# Patient Record
Sex: FEMALE | Race: WHITE | NOT HISPANIC OR LATINO
[De-identification: names, ages, dates, MRNs, and addresses within clinical notes are randomized per-mention and may not be internally consistent; named-entity substitution may affect disease eponyms.]

---

## 2017-01-24 ENCOUNTER — RESULT REVIEW (OUTPATIENT)
Age: 52
End: 2017-01-24

## 2017-01-25 ENCOUNTER — APPOINTMENT (OUTPATIENT)
Dept: OBGYN | Facility: CLINIC | Age: 52
End: 2017-01-25

## 2018-02-20 ENCOUNTER — RESULT REVIEW (OUTPATIENT)
Age: 53
End: 2018-02-20

## 2018-02-21 ENCOUNTER — APPOINTMENT (OUTPATIENT)
Dept: ENDOCRINOLOGY | Facility: CLINIC | Age: 53
End: 2018-02-21
Payer: COMMERCIAL

## 2018-02-21 VITALS
HEART RATE: 93 BPM | HEIGHT: 62 IN | OXYGEN SATURATION: 98 % | BODY MASS INDEX: 16.75 KG/M2 | WEIGHT: 91 LBS | SYSTOLIC BLOOD PRESSURE: 100 MMHG | DIASTOLIC BLOOD PRESSURE: 60 MMHG

## 2018-02-21 PROCEDURE — 99213 OFFICE O/P EST LOW 20 MIN: CPT

## 2018-02-21 RX ORDER — HYDROCORTISONE 25 MG/G
2.5 CREAM TOPICAL
Qty: 28 | Refills: 0 | Status: COMPLETED | COMMUNITY
Start: 2017-10-18

## 2018-02-21 RX ORDER — SULFAMETHOXAZOLE AND TRIMETHOPRIM 800; 160 MG/1; MG/1
800-160 TABLET ORAL
Qty: 14 | Refills: 0 | Status: COMPLETED | COMMUNITY
Start: 2018-02-18

## 2018-10-29 ENCOUNTER — TRANSCRIPTION ENCOUNTER (OUTPATIENT)
Age: 53
End: 2018-10-29

## 2018-11-06 ENCOUNTER — APPOINTMENT (OUTPATIENT)
Dept: ENDOCRINOLOGY | Facility: CLINIC | Age: 53
End: 2018-11-06
Payer: COMMERCIAL

## 2018-11-06 VITALS — SYSTOLIC BLOOD PRESSURE: 104 MMHG | DIASTOLIC BLOOD PRESSURE: 70 MMHG | HEART RATE: 88 BPM | OXYGEN SATURATION: 97 %

## 2018-11-06 VITALS — WEIGHT: 94 LBS | HEIGHT: 62 IN | BODY MASS INDEX: 17.3 KG/M2

## 2018-11-06 PROCEDURE — 77080 DXA BONE DENSITY AXIAL: CPT

## 2018-11-06 PROCEDURE — 99214 OFFICE O/P EST MOD 30 MIN: CPT | Mod: 25

## 2018-11-06 PROCEDURE — ZZZZZ: CPT

## 2018-11-06 RX ORDER — METRONIDAZOLE 7.5 MG/G
0.75 GEL VAGINAL
Qty: 70 | Refills: 0 | Status: COMPLETED | COMMUNITY
Start: 2018-10-31

## 2018-11-06 RX ORDER — FLUCONAZOLE 150 MG/1
150 TABLET ORAL
Qty: 2 | Refills: 0 | Status: COMPLETED | COMMUNITY
Start: 2018-10-31

## 2018-11-06 RX ORDER — AMOXICILLIN AND CLAVULANATE POTASSIUM 875; 125 MG/1; MG/1
875-125 TABLET, COATED ORAL
Qty: 14 | Refills: 0 | Status: COMPLETED | COMMUNITY
Start: 2018-10-15

## 2018-11-06 RX ORDER — PHENAZOPYRIDINE HYDROCHLORIDE 100 MG/1
100 TABLET ORAL
Qty: 12 | Refills: 0 | Status: COMPLETED | COMMUNITY
Start: 2018-10-29

## 2018-11-06 RX ORDER — NITROFURANTOIN (MONOHYDRATE/MACROCRYSTALS) 25; 75 MG/1; MG/1
100 CAPSULE ORAL
Qty: 10 | Refills: 0 | Status: COMPLETED | COMMUNITY
Start: 2018-10-29

## 2018-11-06 RX ORDER — PANTOPRAZOLE 40 MG/1
40 TABLET, DELAYED RELEASE ORAL
Qty: 30 | Refills: 0 | Status: COMPLETED | COMMUNITY
Start: 2018-07-15

## 2018-11-08 ENCOUNTER — APPOINTMENT (OUTPATIENT)
Dept: OBGYN | Facility: CLINIC | Age: 53
End: 2018-11-08
Payer: COMMERCIAL

## 2018-11-08 PROCEDURE — 99214 OFFICE O/P EST MOD 30 MIN: CPT

## 2018-11-08 PROCEDURE — 83986 ASSAY PH BODY FLUID NOS: CPT | Mod: QW

## 2019-07-22 ENCOUNTER — APPOINTMENT (OUTPATIENT)
Dept: OBGYN | Facility: CLINIC | Age: 54
End: 2019-07-22

## 2020-01-27 ENCOUNTER — APPOINTMENT (OUTPATIENT)
Dept: ENDOCRINOLOGY | Facility: CLINIC | Age: 55
End: 2020-01-27
Payer: COMMERCIAL

## 2020-01-27 VITALS
HEART RATE: 74 BPM | HEIGHT: 62 IN | WEIGHT: 100 LBS | BODY MASS INDEX: 18.4 KG/M2 | SYSTOLIC BLOOD PRESSURE: 100 MMHG | DIASTOLIC BLOOD PRESSURE: 70 MMHG | OXYGEN SATURATION: 98 %

## 2020-01-27 PROCEDURE — ZZZZZ: CPT

## 2020-01-27 PROCEDURE — 99214 OFFICE O/P EST MOD 30 MIN: CPT | Mod: 25

## 2020-01-27 PROCEDURE — 77080 DXA BONE DENSITY AXIAL: CPT

## 2020-01-27 RX ORDER — BACILLUS COAGULANS/INULIN 1B-250 MG
CAPSULE ORAL
Refills: 0 | Status: DISCONTINUED | COMMUNITY
End: 2020-01-27

## 2020-01-27 NOTE — REVIEW OF SYSTEMS
[Heartburn] : heartburn [Joint Pain] : joint pain [Hot Flashes] : hot flashes [Negative] : Heme/Lymph

## 2020-01-28 NOTE — PROCEDURE
[FreeTextEntry1] : Thyroid US - 01/27/2020\par Right: 7 x 5 x 9 mm nodule, some heterogeneity bilaterally\par \par Bone mineral density: 01/27/2020 \par Indication: vs. 2018 prior test showed progressive bone loss \par Spine: -2.0 osteopenia, -5.4%\par Total hip: -1.6 osteopenia, no significant change, decreased vs. 2 years ago\par Femoral neck: -2.0 osteopenia, no significant change\par Proximal radius: +0.1 normal -4.4%\par \par Bone mineral density: 11/06/2018 \par Indication: vs. 2016, 2 year comparison\par Spine: -1.5 osteopenia (-3.7%)\par Total hip: -1.5 osteopenia (-4.4%)\par Femoral neck: -2.0 osteopenia (-5.0%)\par Proximal radius: 0.7 normal, no significant change \par \par Bone mineral density November 29, 2016\par Two-year Comparison\par Spine -1.2, osteopenia, no significant change\par Total hip - 1.2, osteopenia, no significant change\par Femoral neck -1.7, osteopenia, no significant change\par Proximal radius 0.8, normal, no significant change\par \par bone mineral density test performed September 16, 2014\par Spine -1.1, osteopenia, stable\par Total hp -1.0, normal\par Femoral neck -1.6, osteopenia, stable\par Proximal radius 0.9, normal\par

## 2020-01-28 NOTE — PHYSICAL EXAM
[Alert] : alert [No Acute Distress] : no acute distress [Well Nourished] : well nourished [Well Developed] : well developed [Normal Sclera/Conjunctiva] : normal sclera/conjunctiva [No Proptosis] : no proptosis [Normal Oropharynx] : the oropharynx was normal [Thyroid Not Enlarged] : the thyroid was not enlarged [No Thyroid Nodules] : there were no palpable thyroid nodules [No Respiratory Distress] : no respiratory distress [No Accessory Muscle Use] : no accessory muscle use [Clear to Auscultation] : lungs were clear to auscultation bilaterally [Normal Rate] : heart rate was normal  [Normal S1, S2] : normal S1 and S2 [Regular Rhythm] : with a regular rhythm [Normal Bowel Sounds] : normal bowel sounds [Not Tender] : non-tender [Soft] : abdomen soft [Not Distended] : not distended [Anterior Cervical Nodes] : anterior cervical nodes [Normal] : normal and non tender [No Spinal Tenderness] : no spinal tenderness [Spine Straight] : spine straight [No Stigmata of Cushings Syndrome] : no stigmata of cushings syndrome [Normal Gait] : normal gait [Normal Strength/Tone] : muscle strength and tone were normal [No Rash] : no rash [Normal Reflexes] : deep tendon reflexes were 2+ and symmetric [No Tremors] : no tremors [Oriented x3] : oriented to person, place, and time [de-identified] : sl blocked R submandibular gland

## 2020-01-28 NOTE — END OF VISIT
[FreeTextEntry3] : I, Elie Martinez, authored this note working as a medical scribe for Dr. Hernandez.  01/27/2020.  3:30PM. This note was authored by the medical scribe for me. I have reviewed, edited, and revised the note as needed. I am in agreement with the exam findings, imaging findings, and treatment plan.  Boaz Hernandez MD

## 2020-01-28 NOTE — HISTORY OF PRESENT ILLNESS
[Calcium (dietary)] : calcium from their regular diet [Regular Dental Follow-Up] : regular dental follow-up [FreeTextEntry1] : f/u for 54 year-old female w/ osteopenia\par \par Pt has a long h/o GERD treated with proton pump inhibitors. Because of this a BMD was performed which showed T score of -1.1 in the spine and -1.6 in the femoral neck. No h/o fx. She has no other unusual risk factors for osteoporosis. No fhx of osteoporosis. Menopausal at age 51 with no plan for HRT. Occ hot flashes. She has a diet rich in dairy foods which has increased since being told of low bone density. Repeat BMD in 2014 and 2016 are stable with osteopenia in the hip and spine and normal density in the proximal radius. BMD 11/2018 indicates sl decrease in the hip and spine though this is still consistent with osteopenia. Currently not on any osteoporosis rx.\par \par Pt previously saw an orthopedic surgeon, she has tendonitis in her hand and bursitis in her elbow. [Amenorrhea] : no past or present history of amenorrhea [Disordered Eating] : no past or present history of disordered eating [Taking Steroids] : no past or present history of taking steroids [Kidney Stones] : no history of kidney stones [High Fall Risk] : no fall risk [Family History of Osteoporosis] : no family history of osteoporosis [Family History of Breast Cancer] : no family history of breast cancer [Family History of Hip Fracture] : no family history of hip fracture [Hyperparathyroidism] : no hyperparathyroidism [History of Radiation Therapy] : no history of radiation therapy [History of Blood Clots] : no history of blood clots

## 2020-01-28 NOTE — ASSESSMENT
[Bisphosphonate Therapy] : Risks  and benefits of bisphosphonate therapy were  discussed with the patient including gastroesophageal irritation, osteonecrosis of the jaw, and atypical femur fractures, and acute phase reaction [Bisphosphonates] : The patient was instructed to take bisphosphonates on an empty stomach with a full glass of water,and wait at least 30 minutes before eating or lying down [FreeTextEntry1] : f/u 54 year-old female w/ osteopenia\par \par Pt is menopausal, not on HRT. Pt has hot flashes, this does not disturb her sleep and is only occasional. \par BMD in 2014 and in 2016 indicate stable osteopenia in te spine and hip and normal density in the proximal radius. BMD 11/2018 indicates sl decrease in the hip and spine though this is still consistent with osteopenia. This is consistent with bone loss seen with menopause. I do not see a reason to treat her bone density with medical rx at this time. BMD 1/2020 indicates worsening osteopenia in spine, stable osteopenia in hip although total hip is drifting down, and normal proximal radius although this is trending downwards also. I discussed that weight bearing exercise, cardiovascular activities, and diet are beneficial to overall health, but are not adequate for bone density increase or alternative to osteoporosis medication.\par \par I recommend the pt start rx due to increased risk for future fx. I discussed rx with bisphosphonate therapy, including Fosamax, Actonel, Boniva, and IV Reclast. Risks and benefits of bisphosphonate therapy were discussed with the patient including minor aches & pains, heartburn, gastroesophageal irritation (excluding IV Reclast), osteonecrosis of the jaw, and atypical femur fractures, and acute phase reaction. Pt would benefit from bisphosphonate therapy with the expectation of a drug holiday within 5 years. All questions were answered. Pt understands and will notify me of rx decision. Medication instructions reviewed.\par \par f/u in 6 months

## 2020-07-27 ENCOUNTER — APPOINTMENT (OUTPATIENT)
Dept: ENDOCRINOLOGY | Facility: CLINIC | Age: 55
End: 2020-07-27

## 2021-02-24 ENCOUNTER — NON-APPOINTMENT (OUTPATIENT)
Age: 56
End: 2021-02-24

## 2021-03-18 ENCOUNTER — LABORATORY RESULT (OUTPATIENT)
Age: 56
End: 2021-03-18

## 2021-03-18 ENCOUNTER — APPOINTMENT (OUTPATIENT)
Dept: ENDOCRINOLOGY | Facility: CLINIC | Age: 56
End: 2021-03-18
Payer: COMMERCIAL

## 2021-03-18 VITALS — DIASTOLIC BLOOD PRESSURE: 60 MMHG | OXYGEN SATURATION: 96 % | SYSTOLIC BLOOD PRESSURE: 100 MMHG | HEART RATE: 87 BPM

## 2021-03-18 VITALS — TEMPERATURE: 98.2 F | WEIGHT: 95 LBS | HEIGHT: 61.8 IN | BODY MASS INDEX: 17.48 KG/M2

## 2021-03-18 PROCEDURE — 99214 OFFICE O/P EST MOD 30 MIN: CPT | Mod: 25

## 2021-03-18 PROCEDURE — 77080 DXA BONE DENSITY AXIAL: CPT

## 2021-03-18 RX ORDER — FAMOTIDINE 10 MG/1
TABLET, FILM COATED ORAL
Refills: 0 | Status: ACTIVE | COMMUNITY

## 2021-03-18 RX ORDER — COLD-HOT PACK
EACH MISCELLANEOUS
Refills: 0 | Status: ACTIVE | COMMUNITY

## 2021-03-18 RX ORDER — OMEGA-3/DHA/EPA/FISH OIL 300-1000MG
CAPSULE ORAL
Refills: 0 | Status: ACTIVE | COMMUNITY

## 2021-03-18 NOTE — PHYSICAL EXAM
[Alert] : alert [Well Nourished] : well nourished [No Acute Distress] : no acute distress [Well Developed] : well developed [Normal Sclera/Conjunctiva] : normal sclera/conjunctiva [EOMI] : extra ocular movement intact [No Proptosis] : no proptosis [Thyroid Not Enlarged] : the thyroid was not enlarged [Clear to Auscultation] : lungs were clear to auscultation bilaterally [Normal S1, S2] : normal S1 and S2 [Normal Rate] : heart rate was normal [Regular Rhythm] : with a regular rhythm [No Edema] : no peripheral edema [Normal Bowel Sounds] : normal bowel sounds [Not Tender] : non-tender [Not Distended] : not distended [Soft] : abdomen soft [Normal Anterior Cervical Nodes] : no anterior cervical lymphadenopathy [No Spinal Tenderness] : no spinal tenderness [Spine Straight] : spine straight [No Stigmata of Cushings Syndrome] : no stigmata of Cushings Syndrome [Normal Gait] : normal gait [Normal Reflexes] : deep tendon reflexes were 2+ and symmetric [No Tremors] : no tremors [Oriented x3] : oriented to person, place, and time

## 2021-03-18 NOTE — ASSESSMENT
[Bisphosphonate Therapy] : Risks and benefits of bisphosphonate therapy were  discussed with the patient including gastroesophageal irritation, osteonecrosis of the jaw, and atypical femur fractures, and acute phase reaction [Bisphosphonates] : The patient was instructed to take bisphosphonates on an empty stomach with a full glass of water,and wait at least 30 minutes before eating or lying down [FreeTextEntry1] : 55 year-old female w/ osteopenia\par \par Pt is menopausal, not on HRT. Pt has hot flashes, this does not disturb her sleep and is only occasional. \par BMD in 2014 and in 2016 indicate stable osteopenia in te spine and hip and normal density in the proximal radius. BMD 11/2018 indicates sl decrease in the hip and spine though this is still consistent with osteopenia. This is consistent with bone loss seen with menopause. I do not see a reason to treat her bone density with medical rx at this time. BMD 1/2020 indicates worsening osteopenia in spine, stable osteopenia in hip although total hip is drifting down, and normal proximal radius although this is trending downwards also. Pt started ibandronate 2/2020 but d/c after one dose due to severe aches and pain. Took correctly. No interval fx, no UGI sx, no thigh pain. No heartburn. No ONJ. Pt did not f/u do to COVID. BMD 3/2021 indicates worsening osteopenia in spine, stable osteopenia in hip, and normal worsening proximal radius. BMD results reviewed w/ pt.\par \par Options of therapy discussed. Reviewed rechallenging with ibandronate and see if symptoms return. Pt agrees to restart Ibandronate. If symptoms develop she will contact me and will revaluate options.\par \par I discussed that weight bearing exercise, cardiovascular activities, and diet are beneficial to overall health, but are not adequate for bone density increase or alternative to osteoporosis medication.\par \par H/o thyroid nodule. TUS 3/2021 c/w trivial subcentimeter nodule. Observe.\par \par Request labs sent out.\par \par F/u in 1 year

## 2021-03-18 NOTE — HISTORY OF PRESENT ILLNESS
[Calcium (dietary)] : calcium from their regular diet [Regular Dental Follow-Up] : regular dental follow-up [FreeTextEntry1] : No significant health change. No interval surgeries, fractures, health change, or change in medications.\par \par Pt has a long h/o GERD treated with proton pump inhibitors. Because of this a BMD was performed which showed T score of -1.1 in the spine and -1.6 in the femoral neck. No h/o fx. She has no other unusual risk factors for osteoporosis. No fhx of osteoporosis. Menopausal at age 51 with no plan for HRT. Occ hot flashes. She has a diet rich in dairy foods which has increased since being told of low bone density. Repeat BMD in 2014 and 2016 are stable with osteopenia in the hip and spine and normal density in the proximal radius. BMD 11/2018 indicated sl decrease in the hip and spine though this is still consistent with osteopenia. BMD 1/2020 indicates worsening osteopenia in spine, stable osteopenia in hip although total hip is drifting down, and normal proximal radius although this is trending downwards also. Pt started ibandronate 2/2020 but d/c after one dose due to severe aches and pain. Took correctly. No interval fx, no UGI sx, no thigh pain. No heartburn. Last DDS within past 6 months. No ONJ. \par \par Pt previously saw an orthopedic surgeon, she has tendonitis in her hand and bursitis in her elbow.\par \par Pt c/o frequent fatigue. [Amenorrhea] : no past or present history of amenorrhea [Disordered Eating] : no past or present history of disordered eating [Taking Steroids] : no past or present history of taking steroids [Kidney Stones] : no history of kidney stones [High Fall Risk] : no fall risk [Family History of Osteoporosis] : no family history of osteoporosis [Family History of Breast Cancer] : no family history of breast cancer [Family History of Hip Fracture] : no family history of hip fracture [Hyperparathyroidism] : no hyperparathyroidism [History of Radiation Therapy] : no history of radiation therapy [History of Blood Clots] : no history of blood clots

## 2021-03-18 NOTE — PROCEDURE
[FreeTextEntry1] : Thyroid US - 03/18/2021\par Right: nodule 6 mm x 5 mm x 9 mm\par \par Bone mineral density: 03/18/2021 \par Indication: vs. 2020 prior test showed bone loss\par Spine: -2.2 osteopenia, -3.8%\par Total hip: -1.7 osteopenia, no significant change\par Femoral neck: -2.0 osteopenia, no significant change\par Proximal radius: -0.2 normal, -3.1%\par \par Thyroid US - 01/27/2020\par Right: 7 x 5 x 9 mm nodule, some heterogeneity bilaterally\par \par Bone mineral density: 01/27/2020 \par Indication: vs. 2018 prior test showed progressive bone loss \par Spine: -2.0 osteopenia, -5.4%\par Total hip: -1.6 osteopenia, no significant change, decreased vs. 2 years ago\par Femoral neck: -2.0 osteopenia, no significant change\par Proximal radius: +0.1 normal -4.4%\par \par Bone mineral density: 11/06/2018 \par Indication: vs. 2016, 2 year comparison\par Spine: -1.5 osteopenia (-3.7%)\par Total hip: -1.5 osteopenia (-4.4%)\par Femoral neck: -2.0 osteopenia (-5.0%)\par Proximal radius: 0.7 normal, no significant change \par \par Bone mineral density November 29, 2016\par Two-year Comparison\par Spine -1.2, osteopenia, no significant change\par Total hip - 1.2, osteopenia, no significant change\par Femoral neck -1.7, osteopenia, no significant change\par Proximal radius 0.8, normal, no significant change\par \par bone mineral density test performed September 16, 2014\par Spine -1.1, osteopenia, stable\par Total hp -1.0, normal\par Femoral neck -1.6, osteopenia, stable\par Proximal radius 0.9, normal\par

## 2021-03-18 NOTE — END OF VISIT
[FreeTextEntry3] : I, Elie Martinez, authored this note working as a medical scribe for Dr. Hernandez.  03/18/2021.  4:00PM. This note was authored by the medical scribe for me. I have reviewed, edited, and revised the note as needed. I am in agreement with the exam findings, imaging findings, and treatment plan.  Boaz Hernandez MD

## 2021-03-19 LAB
25(OH)D3 SERPL-MCNC: 50.7 NG/ML
ALBUMIN SERPL ELPH-MCNC: 5.1 G/DL
ALP BLD-CCNC: 51 U/L
ALT SERPL-CCNC: 12 U/L
ANION GAP SERPL CALC-SCNC: 12 MMOL/L
AST SERPL-CCNC: 22 U/L
BASOPHILS # BLD AUTO: 0.06 K/UL
BASOPHILS NFR BLD AUTO: 1.2 %
BILIRUB SERPL-MCNC: 0.3 MG/DL
BUN SERPL-MCNC: 15 MG/DL
CALCIUM SERPL-MCNC: 10 MG/DL
CHLORIDE SERPL-SCNC: 102 MMOL/L
CO2 SERPL-SCNC: 26 MMOL/L
CREAT SERPL-MCNC: 0.95 MG/DL
EOSINOPHIL # BLD AUTO: 0.04 K/UL
EOSINOPHIL NFR BLD AUTO: 0.8 %
GLUCOSE SERPL-MCNC: 103 MG/DL
HCT VFR BLD CALC: 38.7 %
HGB BLD-MCNC: 12.6 G/DL
IMM GRANULOCYTES NFR BLD AUTO: 0.2 %
LYMPHOCYTES # BLD AUTO: 1.63 K/UL
LYMPHOCYTES NFR BLD AUTO: 31.8 %
MAN DIFF?: NORMAL
MCHC RBC-ENTMCNC: 29.9 PG
MCHC RBC-ENTMCNC: 32.6 GM/DL
MCV RBC AUTO: 91.9 FL
MONOCYTES # BLD AUTO: 0.34 K/UL
MONOCYTES NFR BLD AUTO: 6.6 %
NEUTROPHILS # BLD AUTO: 3.04 K/UL
NEUTROPHILS NFR BLD AUTO: 59.4 %
PLATELET # BLD AUTO: 304 K/UL
POTASSIUM SERPL-SCNC: 4.2 MMOL/L
PROT SERPL-MCNC: 7.1 G/DL
RBC # BLD: 4.21 M/UL
RBC # FLD: 12.1 %
SODIUM SERPL-SCNC: 140 MMOL/L
T3RU NFR SERPL: 1.1 TBI
T4 SERPL-MCNC: 6.2 UG/DL
TSH SERPL-ACNC: 1.3 UIU/ML
VIT B12 SERPL-MCNC: 1071 PG/ML
WBC # FLD AUTO: 5.12 K/UL

## 2022-03-15 ENCOUNTER — APPOINTMENT (OUTPATIENT)
Dept: ENDOCRINOLOGY | Facility: CLINIC | Age: 57
End: 2022-03-15

## 2022-03-18 ENCOUNTER — APPOINTMENT (OUTPATIENT)
Dept: ENDOCRINOLOGY | Facility: CLINIC | Age: 57
End: 2022-03-18
Payer: COMMERCIAL

## 2022-03-18 VITALS — DIASTOLIC BLOOD PRESSURE: 70 MMHG | OXYGEN SATURATION: 98 % | HEART RATE: 88 BPM | SYSTOLIC BLOOD PRESSURE: 100 MMHG

## 2022-03-18 VITALS — HEIGHT: 61.9 IN | BODY MASS INDEX: 17.66 KG/M2 | TEMPERATURE: 98 F | WEIGHT: 96 LBS

## 2022-03-18 PROCEDURE — 77080 DXA BONE DENSITY AXIAL: CPT

## 2022-03-18 PROCEDURE — ZZZZZ: CPT

## 2022-03-18 PROCEDURE — 99214 OFFICE O/P EST MOD 30 MIN: CPT | Mod: 25

## 2022-03-18 RX ORDER — BIOTIN 10 MG
TABLET ORAL
Refills: 0 | Status: DISCONTINUED | COMMUNITY
End: 2022-03-18

## 2022-03-18 RX ORDER — IBANDRONATE SODIUM 150 MG/1
150 TABLET ORAL
Qty: 3 | Refills: 3 | Status: DISCONTINUED | COMMUNITY
Start: 2020-02-04 | End: 2022-03-18

## 2022-03-18 NOTE — ASSESSMENT
[Bisphosphonate Therapy] : Risks and benefits of bisphosphonate therapy were  discussed with the patient including gastroesophageal irritation, osteonecrosis of the jaw, and atypical femur fractures, and acute phase reaction [Bisphosphonates] : The patient was instructed to take bisphosphonates on an empty stomach with a full glass of water,and wait at least 30 minutes before eating or lying down [FreeTextEntry1] : 56 year-old female w/ osteopenia\par \par Began medical therapy, ibandronate 2020 for decreasing bone mass.  Patient tolerating therapy after initial concerns.  Patient does have active upper GI issues but this does not appear to be related to day of ibandronate dosing.  Repeat bone density is stable placing patient at fairly average risk for future fracture.  Options of therapy discussed in detail but recommend beginning drug holiday repeat bone density 1 year.\par \par I discussed that weight bearing exercise, cardiovascular activities, and diet are beneficial to overall health, but are not adequate for bone density increase or alternative to osteoporosis medication.\par \par H/o thyroid nodule. TUS 3/2022 c/w trivial subcentimeter nodule. Observe.\par \par Request labs from Dr. Masoud Carlisle\par \par F/u in 1 year\par repeat BMD next visit

## 2022-06-15 ENCOUNTER — APPOINTMENT (OUTPATIENT)
Dept: ENDOCRINOLOGY | Facility: CLINIC | Age: 57
End: 2022-06-15

## 2023-01-19 ENCOUNTER — APPOINTMENT (OUTPATIENT)
Dept: GASTROENTEROLOGY | Facility: CLINIC | Age: 58
End: 2023-01-19

## 2023-04-20 ENCOUNTER — APPOINTMENT (OUTPATIENT)
Dept: ENDOCRINOLOGY | Facility: CLINIC | Age: 58
End: 2023-04-20
Payer: COMMERCIAL

## 2023-04-20 ENCOUNTER — TRANSCRIPTION ENCOUNTER (OUTPATIENT)
Age: 58
End: 2023-04-20

## 2023-04-20 VITALS
WEIGHT: 101 LBS | DIASTOLIC BLOOD PRESSURE: 62 MMHG | HEIGHT: 61.3 IN | HEART RATE: 73 BPM | OXYGEN SATURATION: 98 % | BODY MASS INDEX: 18.82 KG/M2 | SYSTOLIC BLOOD PRESSURE: 94 MMHG

## 2023-04-20 DIAGNOSIS — E04.1 NONTOXIC SINGLE THYROID NODULE: ICD-10-CM

## 2023-04-20 DIAGNOSIS — M85.80 OTHER SPECIFIED DISORDERS OF BONE DENSITY AND STRUCTURE, UNSPECIFIED SITE: ICD-10-CM

## 2023-04-20 PROCEDURE — ZZZZZ: CPT

## 2023-04-20 PROCEDURE — 99214 OFFICE O/P EST MOD 30 MIN: CPT | Mod: 25

## 2023-04-20 PROCEDURE — 77080 DXA BONE DENSITY AXIAL: CPT

## 2023-04-21 PROBLEM — E04.1 THYROID NODULE: Status: ACTIVE | Noted: 2021-03-18

## 2023-04-21 NOTE — ASSESSMENT
[Bisphosphonate Therapy] : Risks and benefits of bisphosphonate therapy were  discussed with the patient including gastroesophageal irritation, osteonecrosis of the jaw, and atypical femur fractures, and acute phase reaction [Bisphosphonates] : The patient was instructed to take bisphosphonates on an empty stomach with a full glass of water,and wait at least 30 minutes before eating or lying down [FreeTextEntry1] : 57 year-old female w/ osteopenia\par \par Began medical therapy, ibandronate 2020 for decreasing bone mass.  Patient tolerating therapy after initial concerns.  Patient does have active upper GI issues but this does not appear to be related to day of ibandronate dosing.  Repeat bone density is stable placing patient at fairly average risk for future fracture.\par   Began drug holiday 2022.  Repeat bone density 2023 stable osteopenia.  Recommend continuing drug holiday.\par \par I discussed that weight bearing exercise, cardiovascular activities, and diet are beneficial to overall health, but are not adequate for bone density increase or alternative to osteoporosis medication.\par \par H/o thyroid nodule. TUS 2023  trivial subcentimeter nodule. Observe.\par \par Request labs from Dr. Masoud Carlisle\par \par F/u in 1 year\par repeat BMD next visit

## 2023-04-21 NOTE — PROCEDURE
[FreeTextEntry1] : thyroid ultrasound  4/20/23\par Single right nodule 6 mm x 6 mm x 8 mm\par \par \par Bone mineral density: 04/20/2023\par indication: Compared to 2022 assess response to medication\par spine -2.2 osteopenia no significant change\par total hip -1.8 osteopenia no significant change\par femoral neck -2.2 osteopenia no significant change\par proximal radius -0.6 normal no significant change\par \par Thyroid ultrasound March 18, 2022\par Single right nodule 6 mm x 5 mm x 8 mm\par \par Bone mineral density: 03/18/2022\par indication: Compared to 2021 assess response to medication\par spine -2.1 osteopenia no significant change\par total hip -1.6 osteopenia no significant change\par femoral neck -2.0 osteopenia no significant change\par proximal radius -0.4 normal no significant change\par \par Thyroid US - 03/18/2021\par Right: nodule 6 mm x 5 mm x 9 mm\par \par Bone mineral density: 03/18/2021 \par Indication: vs. 2020 prior test showed bone loss\par Spine: -2.2 osteopenia, -3.8%\par Total hip: -1.7 osteopenia, no significant change\par Femoral neck: -2.0 osteopenia, no significant change\par Proximal radius: -0.2 normal, -3.1%\par \par Thyroid US - 01/27/2020\par Right: 7 x 5 x 9 mm nodule, some heterogeneity bilaterally\par \par Bone mineral density: 01/27/2020 \par Indication: vs. 2018 prior test showed progressive bone loss \par Spine: -2.0 osteopenia, -5.4%\par Total hip: -1.6 osteopenia, no significant change, decreased vs. 2 years ago\par Femoral neck: -2.0 osteopenia, no significant change\par Proximal radius: +0.1 normal -4.4%\par \par Bone mineral density: 11/06/2018 \par Indication: vs. 2016, 2 year comparison\par Spine: -1.5 osteopenia (-3.7%)\par Total hip: -1.5 osteopenia (-4.4%)\par Femoral neck: -2.0 osteopenia (-5.0%)\par Proximal radius: 0.7 normal, no significant change \par \par Bone mineral density November 29, 2016\par Two-year Comparison\par Spine -1.2, osteopenia, no significant change\par Total hip - 1.2, osteopenia, no significant change\par Femoral neck -1.7, osteopenia, no significant change\par Proximal radius 0.8, normal, no significant change\par \par bone mineral density test performed September 16, 2014\par Spine -1.1, osteopenia, stable\par Total hp -1.0, normal\par Femoral neck -1.6, osteopenia, stable\par Proximal radius 0.9, normal\par

## 2023-04-21 NOTE — HISTORY OF PRESENT ILLNESS
[Calcium (dietary)] : calcium from their regular diet [Regular Dental Follow-Up] : regular dental follow-up [FreeTextEntry1] : No significant health change. No interval surgeries, fractures, health change, or change in medications.\par \par Pt has a long h/o GERD treated with proton pump inhibitors. Because of this a BMD was performed which showed T score of -1.1 in the spine and -1.6 in the femoral neck. No h/o fx. She has no other unusual risk factors for osteoporosis. No fhx of osteoporosis. Menopausal at age 51 with no plan for HRT. Occ hot flashes. She has a diet rich in dairy foods which has increased since being told of low bone density. Repeat BMD in 2014 and 2016 are stable with osteopenia in the hip and spine and normal density in the proximal radius. BMD 11/2018 indicated sl decrease in the hip and spine though this is still consistent with osteopenia. BMD 1/2020 indicates worsening osteopenia in spine, stable osteopenia in hip although total hip is drifting down, and normal proximal radius although this is trending downwards also. Pt started ibandronate 2/2020 but d/c after one dose due to severe aches and pain. \par Restarted medicine which she is taking correctly and tolerating well.  She does have some upper GI symptoms him not related to timing of dose.\par  . Last DDS within past 6 months. No ONJ. \par History of subcentimeter thyroid nodule.  Low no local neck changes.  No obvious symptoms of hyperthyroidism or hypothyroidism other than fatigue which is stable.\par  [Amenorrhea] : no past or present history of amenorrhea [Disordered Eating] : no past or present history of disordered eating [Taking Steroids] : no past or present history of taking steroids [Kidney Stones] : no history of kidney stones [High Fall Risk] : no fall risk [Family History of Osteoporosis] : no family history of osteoporosis [Family History of Breast Cancer] : no family history of breast cancer [Family History of Hip Fracture] : no family history of hip fracture [Hyperparathyroidism] : no hyperparathyroidism [History of Radiation Therapy] : no history of radiation therapy [History of Blood Clots] : no history of blood clots

## 2023-08-24 ENCOUNTER — NON-APPOINTMENT (OUTPATIENT)
Age: 58
End: 2023-08-24

## 2023-09-14 ENCOUNTER — APPOINTMENT (OUTPATIENT)
Dept: GASTROENTEROLOGY | Facility: CLINIC | Age: 58
End: 2023-09-14
Payer: COMMERCIAL

## 2023-09-14 VITALS — SYSTOLIC BLOOD PRESSURE: 102 MMHG | DIASTOLIC BLOOD PRESSURE: 65 MMHG | OXYGEN SATURATION: 98 % | HEART RATE: 76 BPM

## 2023-09-14 VITALS — WEIGHT: 98 LBS | BODY MASS INDEX: 18.34 KG/M2

## 2023-09-14 DIAGNOSIS — R12 HEARTBURN: ICD-10-CM

## 2023-09-14 PROCEDURE — 99204 OFFICE O/P NEW MOD 45 MIN: CPT

## 2023-09-15 ENCOUNTER — RX RENEWAL (OUTPATIENT)
Age: 58
End: 2023-09-15

## 2023-10-05 ENCOUNTER — LABORATORY RESULT (OUTPATIENT)
Age: 58
End: 2023-10-05

## 2023-10-05 ENCOUNTER — APPOINTMENT (OUTPATIENT)
Dept: OBGYN | Facility: CLINIC | Age: 58
End: 2023-10-05
Payer: COMMERCIAL

## 2023-10-05 VITALS — DIASTOLIC BLOOD PRESSURE: 75 MMHG | HEIGHT: 61 IN | SYSTOLIC BLOOD PRESSURE: 108 MMHG

## 2023-10-05 DIAGNOSIS — Z01.419 ENCOUNTER FOR GYNECOLOGICAL EXAMINATION (GENERAL) (ROUTINE) W/OUT ABNORMAL FINDINGS: ICD-10-CM

## 2023-10-05 PROCEDURE — 82270 OCCULT BLOOD FECES: CPT

## 2023-10-05 PROCEDURE — 99213 OFFICE O/P EST LOW 20 MIN: CPT | Mod: 25

## 2023-10-05 PROCEDURE — 99386 PREV VISIT NEW AGE 40-64: CPT

## 2023-10-08 LAB — HPV HIGH+LOW RISK DNA PNL CVX: DETECTED

## 2023-10-11 LAB — CYTOLOGY CVX/VAG DOC THIN PREP: ABNORMAL

## 2023-10-22 ENCOUNTER — RX RENEWAL (OUTPATIENT)
Age: 58
End: 2023-10-22

## 2023-10-22 RX ORDER — FAMOTIDINE 20 MG/1
20 TABLET, FILM COATED ORAL
Qty: 270 | Refills: 3 | Status: ACTIVE | COMMUNITY
Start: 2023-09-14 | End: 1900-01-01

## 2023-12-05 ENCOUNTER — APPOINTMENT (OUTPATIENT)
Dept: OBGYN | Facility: CLINIC | Age: 58
End: 2023-12-05

## 2024-01-04 ENCOUNTER — RX RENEWAL (OUTPATIENT)
Age: 59
End: 2024-01-04

## 2024-01-04 RX ORDER — CONJUGATED ESTROGENS 0.62 MG/G
0.62 CREAM VAGINAL
Qty: 30 | Refills: 0 | Status: ACTIVE | COMMUNITY
Start: 2023-10-06 | End: 1900-01-01

## 2024-01-08 ENCOUNTER — APPOINTMENT (OUTPATIENT)
Dept: OBGYN | Facility: CLINIC | Age: 59
End: 2024-01-08
Payer: COMMERCIAL

## 2024-01-08 ENCOUNTER — ASOB RESULT (OUTPATIENT)
Age: 59
End: 2024-01-08

## 2024-01-08 VITALS — DIASTOLIC BLOOD PRESSURE: 71 MMHG | SYSTOLIC BLOOD PRESSURE: 106 MMHG

## 2024-01-08 DIAGNOSIS — N95.2 POSTMENOPAUSAL ATROPHIC VAGINITIS: ICD-10-CM

## 2024-01-08 PROCEDURE — 76830 TRANSVAGINAL US NON-OB: CPT

## 2024-01-08 PROCEDURE — 99213 OFFICE O/P EST LOW 20 MIN: CPT

## 2024-01-08 NOTE — PLAN
[FreeTextEntry1] : Vaginal Atrophy: Significantly improved Continue w/ Premarin intravaginally twice a week and externally daily Advised coconut oil as vaginal moisturizer and lubricant  HPV pos: Nutrition, exercise, and stress reduction discussed Nature of disease and management of of HPV w/ normal Pap was discussed. Secondary to pt concern, will re-Pap in 3 months. If HPV persistent at that time, will proceed w/ colposcopic eval. Even though this is above the recommendation of the [facility]P. Advised to repeat Pap in 3 months

## 2024-01-08 NOTE — HISTORY OF PRESENT ILLNESS
[FreeTextEntry1] : 01/08/2024 JOSE E LOO 58-year-old female presents for follow-up visit, for vaginal atrophy, HPV pos, review of pelvic sonogram.  Patient has been using Premarin intravaginally twice a week. She uses coconut oil occasionally. Pt sexually active in mutually monogamous relationship of 4 years.   Reviewed last Pap smear in 10/23 - NILM, HPV pos.   Reviewed today's pelvic sono - thin endometrium, normal ovaries.

## 2024-01-08 NOTE — SIGNATURES
[TextEntry] : This note was authored by Radha Forte working as a scribe for Dr. Vaughn Liao.   I, Dr. Vaughn Liao, have reviewed the content of this note and confirm it is true and accurate. I personally performed the history and physical examination and made all the decisions. 01/08/2024

## 2024-01-08 NOTE — REASON FOR VISIT
[Follow-Up] : a follow-up evaluation of [FreeTextEntry2] : vaginal atrophy, HPV pos, review of pelvic sonogram.

## 2024-04-03 ENCOUNTER — RX RENEWAL (OUTPATIENT)
Age: 59
End: 2024-04-03

## 2024-04-03 RX ORDER — OMEPRAZOLE 20 MG/1
20 CAPSULE, DELAYED RELEASE ORAL DAILY
Qty: 180 | Refills: 3 | Status: ACTIVE | COMMUNITY
Start: 2023-09-14 | End: 1900-01-01

## 2024-04-08 ENCOUNTER — APPOINTMENT (OUTPATIENT)
Dept: OBGYN | Facility: CLINIC | Age: 59
End: 2024-04-08
Payer: COMMERCIAL

## 2024-04-08 VITALS — DIASTOLIC BLOOD PRESSURE: 72 MMHG | SYSTOLIC BLOOD PRESSURE: 112 MMHG

## 2024-04-08 DIAGNOSIS — K64.9 UNSPECIFIED HEMORRHOIDS: ICD-10-CM

## 2024-04-08 DIAGNOSIS — B97.7 PAPILLOMAVIRUS AS THE CAUSE OF DISEASES CLASSIFIED ELSEWHERE: ICD-10-CM

## 2024-04-08 PROCEDURE — 99212 OFFICE O/P EST SF 10 MIN: CPT

## 2024-04-08 RX ORDER — HYDROCORTISONE 25 MG/G
2.5 CREAM TOPICAL 3 TIMES DAILY
Qty: 1 | Refills: 2 | Status: ACTIVE | COMMUNITY
Start: 2024-04-08 | End: 1900-01-01

## 2024-04-08 NOTE — HISTORY OF PRESENT ILLNESS
[FreeTextEntry1] : 58 year old JOSE E LOO pt presents for repap due to high risk HPV   Reviewed last Pap smear from 10/2023 - NILM, HPV pos. Again reviewed ACCP guidelines and that this pap is being done too early at the pt's demand. As well as there will be some amount of confusion in the management given the timing of the pap is incorrect.   Pt reports irritated hemorrhoids with improvements with Anusol. Has seen colon-rectal, wishes names of alternative MDs and has requested medication renewal.

## 2024-04-08 NOTE — PLAN
[FreeTextEntry1] : 58 year old JOSE E LOO pt presents for repap due to high risk HPV  Nutrition, exercises, and stress reduction discussed  Pap smear done today  If HPV persistent at this time, will proceed with colposcopy evaluation.  Hemorrhoids:  Rx given for Anusol R/B/A discussed. Names of colon-rectal given   RTO PRN or for annual gyn exam

## 2024-04-09 LAB — HPV HIGH+LOW RISK DNA PNL CVX: NOT DETECTED

## 2024-04-16 LAB — CYTOLOGY CVX/VAG DOC THIN PREP: ABNORMAL

## 2024-04-19 ENCOUNTER — NON-APPOINTMENT (OUTPATIENT)
Age: 59
End: 2024-04-19

## 2024-06-20 ENCOUNTER — APPOINTMENT (OUTPATIENT)
Dept: ENDOCRINOLOGY | Facility: CLINIC | Age: 59
End: 2024-06-20

## 2024-09-30 ENCOUNTER — RX RENEWAL (OUTPATIENT)
Age: 59
End: 2024-09-30

## 2024-11-26 ENCOUNTER — NON-APPOINTMENT (OUTPATIENT)
Age: 59
End: 2024-11-26

## 2024-11-26 ENCOUNTER — LABORATORY RESULT (OUTPATIENT)
Age: 59
End: 2024-11-26

## 2024-11-26 ENCOUNTER — APPOINTMENT (OUTPATIENT)
Dept: NEUROLOGY | Facility: CLINIC | Age: 59
End: 2024-11-26
Payer: COMMERCIAL

## 2024-11-26 VITALS
WEIGHT: 97 LBS | BODY MASS INDEX: 18.31 KG/M2 | HEIGHT: 61 IN | HEART RATE: 82 BPM | DIASTOLIC BLOOD PRESSURE: 70 MMHG | SYSTOLIC BLOOD PRESSURE: 109 MMHG

## 2024-11-26 DIAGNOSIS — J34.89 OTHER SPECIFIED DISORDERS OF NOSE AND NASAL SINUSES: ICD-10-CM

## 2024-11-26 DIAGNOSIS — R51.0 HEADACHE WITH ORTHOSTATIC COMPONENT, NOT ELSEWHERE CLASSIFIED: ICD-10-CM

## 2024-11-26 DIAGNOSIS — R51.9 HEADACHE, UNSPECIFIED: ICD-10-CM

## 2024-11-26 LAB
ALBUMIN SERPL ELPH-MCNC: 5 G/DL
ALP BLD-CCNC: 62 U/L
ALT SERPL-CCNC: 13 U/L
ANION GAP SERPL CALC-SCNC: 14 MMOL/L
AST SERPL-CCNC: 21 U/L
BASOPHILS # BLD AUTO: 0.07 K/UL
BASOPHILS NFR BLD AUTO: 1.2 %
BILIRUB SERPL-MCNC: 0.3 MG/DL
BUN SERPL-MCNC: 17 MG/DL
CALCIUM SERPL-MCNC: 10.1 MG/DL
CCP AB SER IA-ACNC: <8 U/ML
CHLORIDE SERPL-SCNC: 101 MMOL/L
CO2 SERPL-SCNC: 27 MMOL/L
CORTIS SERPL-MCNC: 9.5 UG/DL
CREAT SERPL-MCNC: 0.95 MG/DL
CRP SERPL-MCNC: <3 MG/L
EGFR: 70 ML/MIN/1.73M2
EOSINOPHIL # BLD AUTO: 0.04 K/UL
EOSINOPHIL NFR BLD AUTO: 0.7 %
ERYTHROCYTE [SEDIMENTATION RATE] IN BLOOD BY WESTERGREN METHOD: 12 MM/HR
ESTIMATED AVERAGE GLUCOSE: 105 MG/DL
FSH SERPL-MCNC: 109 IU/L
GLUCOSE SERPL-MCNC: 93 MG/DL
HBA1C MFR BLD HPLC: 5.3 %
HCT VFR BLD CALC: 43.5 %
HGB BLD-MCNC: 14 G/DL
IMM GRANULOCYTES NFR BLD AUTO: 0.2 %
LH SERPL-ACNC: 53.2 IU/L
LYMPHOCYTES # BLD AUTO: 1.38 K/UL
LYMPHOCYTES NFR BLD AUTO: 23.6 %
MAN DIFF?: NORMAL
MCHC RBC-ENTMCNC: 29.9 PG
MCHC RBC-ENTMCNC: 32.2 G/DL
MCV RBC AUTO: 92.9 FL
MONOCYTES # BLD AUTO: 0.25 K/UL
MONOCYTES NFR BLD AUTO: 4.3 %
NEUTROPHILS # BLD AUTO: 4.1 K/UL
NEUTROPHILS NFR BLD AUTO: 70 %
PLATELET # BLD AUTO: 291 K/UL
POTASSIUM SERPL-SCNC: 4.1 MMOL/L
PROLACTIN SERPL-MCNC: 5.5 NG/ML
PROT SERPL-MCNC: 7.7 G/DL
RBC # BLD: 4.68 M/UL
RBC # FLD: 12.7 %
RF+CCP IGG SER-IMP: NEGATIVE
RHEUMATOID FACT SER QL: 11 IU/ML
SODIUM SERPL-SCNC: 142 MMOL/L
T3FREE SERPL-MCNC: 3.3 PG/ML
T4 FREE SERPL-MCNC: 1.2 NG/DL
TESTOST SERPL-MCNC: <2.5 NG/DL
TSH SERPL-ACNC: 1.71 UIU/ML
WBC # FLD AUTO: 5.85 K/UL

## 2024-11-26 PROCEDURE — 99204 OFFICE O/P NEW MOD 45 MIN: CPT

## 2024-11-27 LAB — T PALLIDUM AB SER QL IA: NEGATIVE

## 2024-11-28 LAB
ALBUMIN MFR SERPL ELPH: 66.7 %
ALBUMIN SERPL-MCNC: 5.1 G/DL
ALBUMIN/GLOB SERPL: 2 RATIO
ALPHA1 GLOB MFR SERPL ELPH: 3.4 %
ALPHA1 GLOB SERPL ELPH-MCNC: 0.3 G/DL
ALPHA2 GLOB MFR SERPL ELPH: 8.3 %
ALPHA2 GLOB SERPL ELPH-MCNC: 0.6 G/DL
ANACR T: NEGATIVE
B-GLOBULIN MFR SERPL ELPH: 10.2 %
B-GLOBULIN SERPL ELPH-MCNC: 0.8 G/DL
DEPRECATED KAPPA LC FREE/LAMBDA SER: 1.63 RATIO
GAMMA GLOB FLD ELPH-MCNC: 0.9 G/DL
GAMMA GLOB MFR SERPL ELPH: 11.4 %
IGA 24H UR QL IFE: NORMAL
IGA SER QL IEP: 184 MG/DL
IGF BP1 SERPL-MCNC: 118 NG/ML
IGG SER QL IEP: 878 MG/DL
IGM SER QL IEP: 104 MG/DL
INTERPRETATION SERPL IEP-IMP: NORMAL
KAPPA LC CSF-MCNC: 1.12 MG/DL
KAPPA LC SERPL-MCNC: 1.83 MG/DL
M PROTEIN SPEC IFE-MCNC: NORMAL
PROT SERPL-MCNC: 7.7 G/DL
PROT SERPL-MCNC: 7.7 G/DL

## 2024-12-03 ENCOUNTER — APPOINTMENT (OUTPATIENT)
Dept: MRI IMAGING | Facility: CLINIC | Age: 59
End: 2024-12-03
Payer: COMMERCIAL

## 2024-12-03 ENCOUNTER — TRANSCRIPTION ENCOUNTER (OUTPATIENT)
Age: 59
End: 2024-12-03

## 2024-12-03 LAB
AMPA-R ABCBA: NEGATIVE
AMPHIPHYSIN IGG TITR SER IF: NEGATIVE
ANNOTATION COMMENT IMP: NORMAL
CASPR2-IGG CBA, S: NEGATIVE
CV2 IGG TITR SER: NEGATIVE
GABA-B ABCBA: NEGATIVE
GAD65 AB SER-MCNC: 0 NMOL/L
GFAP IFA, S: NEGATIVE
GLIAL NUC TYPE 1 AB TITR SER: NEGATIVE
HU1 AB TITR SER: NEGATIVE
HU2 AB TITR SER IF: NEGATIVE
HU3 AB TITR SER: NEGATIVE
IMMUNOLOGIST REVIEW: NORMAL
LGI1-IGG CBA, S: NEGATIVE
MGLUR1 AB IFA, S: NEGATIVE
NEUROCHONDRIN-IFA, SERUM: NEGATIVE
NIF IFA, S: NEGATIVE
NMDA-R ABCBA: NEGATIVE
PCA-1 AB TITR SER: NEGATIVE
PCA-2 AB TITR SER: NEGATIVE
PCA-TR AB TITR SER: NEGATIVE

## 2024-12-03 PROCEDURE — 70553 MRI BRAIN STEM W/O & W/DYE: CPT

## 2024-12-03 PROCEDURE — A9585: CPT

## 2024-12-04 ENCOUNTER — NON-APPOINTMENT (OUTPATIENT)
Age: 59
End: 2024-12-04

## 2024-12-04 PROBLEM — R51.0 POSTURAL HEADACHE: Status: ACTIVE | Noted: 2024-12-04

## 2024-12-05 ENCOUNTER — TRANSCRIPTION ENCOUNTER (OUTPATIENT)
Age: 59
End: 2024-12-05

## 2024-12-12 ENCOUNTER — APPOINTMENT (OUTPATIENT)
Dept: MRI IMAGING | Facility: CLINIC | Age: 59
End: 2024-12-12
Payer: COMMERCIAL

## 2024-12-12 PROCEDURE — 72141 MRI NECK SPINE W/O DYE: CPT

## 2024-12-12 PROCEDURE — 72146 MRI CHEST SPINE W/O DYE: CPT

## 2024-12-12 PROCEDURE — 72148 MRI LUMBAR SPINE W/O DYE: CPT

## 2024-12-16 ENCOUNTER — NON-APPOINTMENT (OUTPATIENT)
Age: 59
End: 2024-12-16

## 2024-12-18 ENCOUNTER — APPOINTMENT (OUTPATIENT)
Dept: PAIN MANAGEMENT | Facility: CLINIC | Age: 59
End: 2024-12-18

## 2024-12-18 VITALS
HEIGHT: 61 IN | BODY MASS INDEX: 18.31 KG/M2 | WEIGHT: 97 LBS | HEART RATE: 80 BPM | SYSTOLIC BLOOD PRESSURE: 116 MMHG | DIASTOLIC BLOOD PRESSURE: 79 MMHG

## 2024-12-18 PROCEDURE — 99215 OFFICE O/P EST HI 40 MIN: CPT

## 2024-12-18 PROCEDURE — 99205 OFFICE O/P NEW HI 60 MIN: CPT

## 2025-01-30 ENCOUNTER — APPOINTMENT (OUTPATIENT)
Dept: ENDOCRINOLOGY | Facility: CLINIC | Age: 60
End: 2025-01-30
Payer: COMMERCIAL

## 2025-01-30 VITALS
HEIGHT: 61.5 IN | BODY MASS INDEX: 18.08 KG/M2 | WEIGHT: 97 LBS | OXYGEN SATURATION: 97 % | DIASTOLIC BLOOD PRESSURE: 54 MMHG | HEART RATE: 79 BPM | SYSTOLIC BLOOD PRESSURE: 106 MMHG

## 2025-01-30 DIAGNOSIS — D35.2 BENIGN NEOPLASM OF PITUITARY GLAND: ICD-10-CM

## 2025-01-30 DIAGNOSIS — M85.80 OTHER SPECIFIED DISORDERS OF BONE DENSITY AND STRUCTURE, UNSPECIFIED SITE: ICD-10-CM

## 2025-01-30 DIAGNOSIS — E04.1 NONTOXIC SINGLE THYROID NODULE: ICD-10-CM

## 2025-01-30 PROCEDURE — ZZZZZ: CPT

## 2025-01-30 PROCEDURE — 99214 OFFICE O/P EST MOD 30 MIN: CPT

## 2025-05-08 ENCOUNTER — APPOINTMENT (OUTPATIENT)
Dept: OBGYN | Facility: CLINIC | Age: 60
End: 2025-05-08
Payer: COMMERCIAL

## 2025-05-08 ENCOUNTER — NON-APPOINTMENT (OUTPATIENT)
Age: 60
End: 2025-05-08

## 2025-05-08 VITALS
SYSTOLIC BLOOD PRESSURE: 98 MMHG | BODY MASS INDEX: 18.27 KG/M2 | HEIGHT: 61.5 IN | WEIGHT: 98 LBS | DIASTOLIC BLOOD PRESSURE: 63 MMHG

## 2025-05-08 DIAGNOSIS — R92.30 DENSE BREASTS, UNSPECIFIED: ICD-10-CM

## 2025-05-08 DIAGNOSIS — Z12.31 ENCOUNTER FOR SCREENING MAMMOGRAM FOR MALIGNANT NEOPLASM OF BREAST: ICD-10-CM

## 2025-05-08 DIAGNOSIS — Z01.419 ENCOUNTER FOR GYNECOLOGICAL EXAMINATION (GENERAL) (ROUTINE) W/OUT ABNORMAL FINDINGS: ICD-10-CM

## 2025-05-08 PROCEDURE — 99459 PELVIC EXAMINATION: CPT

## 2025-05-08 PROCEDURE — 99396 PREV VISIT EST AGE 40-64: CPT

## 2025-05-08 PROCEDURE — 82270 OCCULT BLOOD FECES: CPT

## 2025-05-12 LAB — HPV HIGH+LOW RISK DNA PNL CVX: NOT DETECTED

## 2025-05-13 LAB — CYTOLOGY CVX/VAG DOC THIN PREP: NORMAL

## 2025-06-03 ENCOUNTER — OUTPATIENT (OUTPATIENT)
Dept: OUTPATIENT SERVICES | Facility: HOSPITAL | Age: 60
LOS: 1 days | End: 2025-06-03
Payer: COMMERCIAL

## 2025-06-03 ENCOUNTER — RESULT REVIEW (OUTPATIENT)
Age: 60
End: 2025-06-03

## 2025-06-03 ENCOUNTER — APPOINTMENT (OUTPATIENT)
Dept: ULTRASOUND IMAGING | Facility: CLINIC | Age: 60
End: 2025-06-03
Payer: COMMERCIAL

## 2025-06-03 ENCOUNTER — APPOINTMENT (OUTPATIENT)
Dept: MAMMOGRAPHY | Facility: CLINIC | Age: 60
End: 2025-06-03
Payer: COMMERCIAL

## 2025-06-03 DIAGNOSIS — Z12.31 ENCOUNTER FOR SCREENING MAMMOGRAM FOR MALIGNANT NEOPLASM OF BREAST: ICD-10-CM

## 2025-06-03 DIAGNOSIS — R92.30 DENSE BREASTS, UNSPECIFIED: ICD-10-CM

## 2025-06-03 PROCEDURE — 76641 ULTRASOUND BREAST COMPLETE: CPT | Mod: 26,50

## 2025-06-03 PROCEDURE — 77067 SCR MAMMO BI INCL CAD: CPT | Mod: 26

## 2025-06-03 PROCEDURE — 76641 ULTRASOUND BREAST COMPLETE: CPT

## 2025-06-03 PROCEDURE — 77063 BREAST TOMOSYNTHESIS BI: CPT | Mod: 26

## 2025-06-03 PROCEDURE — 77063 BREAST TOMOSYNTHESIS BI: CPT

## 2025-06-03 PROCEDURE — 77067 SCR MAMMO BI INCL CAD: CPT

## 2025-06-04 DIAGNOSIS — R92.8 OTHER ABNORMAL AND INCONCLUSIVE FINDINGS ON DIAGNOSTIC IMAGING OF BREAST: ICD-10-CM

## 2025-09-18 ENCOUNTER — RX RENEWAL (OUTPATIENT)
Age: 60
End: 2025-09-18